# Patient Record
Sex: MALE | Race: WHITE | NOT HISPANIC OR LATINO | Employment: OTHER | URBAN - METROPOLITAN AREA
[De-identification: names, ages, dates, MRNs, and addresses within clinical notes are randomized per-mention and may not be internally consistent; named-entity substitution may affect disease eponyms.]

---

## 2017-02-01 ENCOUNTER — OFFICE VISIT (OUTPATIENT)
Dept: FAMILY MEDICINE CLINIC | Facility: CLINIC | Age: 82
End: 2017-02-01

## 2017-02-01 VITALS
SYSTOLIC BLOOD PRESSURE: 104 MMHG | TEMPERATURE: 97.3 F | DIASTOLIC BLOOD PRESSURE: 54 MMHG | HEIGHT: 75 IN | OXYGEN SATURATION: 96 % | HEART RATE: 66 BPM | BODY MASS INDEX: 15.02 KG/M2 | WEIGHT: 120.8 LBS | RESPIRATION RATE: 17 BRPM

## 2017-02-01 DIAGNOSIS — F03.90 DEMENTIA WITHOUT BEHAVIORAL DISTURBANCE, UNSPECIFIED DEMENTIA TYPE: ICD-10-CM

## 2017-02-01 DIAGNOSIS — M48.061 SPINAL STENOSIS OF LUMBAR REGION: ICD-10-CM

## 2017-02-01 DIAGNOSIS — R35.1 NOCTURIA: ICD-10-CM

## 2017-02-01 DIAGNOSIS — Z79.899 HIGH RISK MEDICATION USE: Primary | ICD-10-CM

## 2017-02-01 DIAGNOSIS — K92.1 FRANK BLOOD IN STOOL: ICD-10-CM

## 2017-02-01 DIAGNOSIS — I73.9 PVD (PERIPHERAL VASCULAR DISEASE) (HCC): ICD-10-CM

## 2017-02-01 PROCEDURE — 99213 OFFICE O/P EST LOW 20 MIN: CPT | Performed by: FAMILY MEDICINE

## 2017-02-01 RX ORDER — TRAMADOL HYDROCHLORIDE 50 MG/1
50 TABLET ORAL EVERY 6 HOURS PRN
Qty: 40 TABLET | Refills: 5 | Status: SHIPPED | OUTPATIENT
Start: 2017-02-01

## 2017-02-01 NOTE — PROGRESS NOTES
HPI  Jefferson Vasques is a 85 y.o. male who is here for follow up of ongoing medical issues.  Wife reports hip pain has worsened and is uncontrolled with Tylenol.  Trial of other medications in the past but caused too much mental changes.  Continues with urinary symptoms and urinates 5 times per night.  Has caregivers at home.  Concerned about weight loss and is getting supplements etc.      Review of Systems   Constitutional: Positive for unexpected weight change.   Gastrointestinal:        Hip pain   Neurological: Positive for weakness.   All other systems reviewed and are negative.        No past medical history on file.    No past surgical history on file.    No family history on file.    Social History     Social History   • Marital status:      Spouse name: N/A   • Number of children: N/A   • Years of education: N/A     Occupational History   • Not on file.     Social History Main Topics   • Smoking status: Never Smoker   • Smokeless tobacco: Never Used   • Alcohol use No   • Drug use: No   • Sexual activity: No     Other Topics Concern   • Not on file     Social History Narrative   • No narrative on file         Physical Exam   Constitutional: He appears well-developed. No distress.   Very thin with little subcutaneous tissue   HENT:   Head: Normocephalic.   Mouth/Throat: Oropharynx is clear and moist.   Eyes: Conjunctivae are normal. Pupils are equal, round, and reactive to light.   Neck:   Slightly wasted   Cardiovascular: Normal rate.    Pulmonary/Chest: Effort normal. No respiratory distress.   Abdominal: Soft. He exhibits no distension. There is no tenderness.   Musculoskeletal: He exhibits no edema or deformity.   Neurological: He is alert. Coordination abnormal.   Generalized weakness only able to transfer short distances from wheelchair   Skin: Skin is warm and dry.   Psychiatric: He has a normal mood and affect. His behavior is normal. Judgment and thought content normal.   Nursing note and  vitals reviewed.        Assessment/Plan    Jefferson was seen today for dementia, hyperlipidemia and hip pain.    Diagnoses and all orders for this visit:    High risk medication use  -     Basic Metabolic Panel    Maurizio blood in stool  -     CBC & Differential    PVD (peripheral vascular disease)    Dementia without behavioral disturbance, unspecified dementia type    Spinal stenosis of lumbar region    Nocturia    Other orders  -     traMADol (ULTRAM) 50 MG tablet; Take 1 tablet by mouth Every 6 (Six) Hours As Needed for moderate pain (4-6).      Routine follow-up of multiple medical problems.  Remains mostly confined to wheelchair and is having assistance at home 24 hours.  Main complaint is worsening hip pain especially awakening him early in the morning.  Again discussed treatment options specifically when necessary pain medication.  Potential risks versus benefits.  Will give trial of tramadol and call depending on response.  Otherwise continue current therapy including nutritional supplements as discussed.    This note includes information entered using a voice recognition dictation system.  Though reviewed, some nonsensible errors may remain.

## 2017-02-02 LAB
BASOPHILS # BLD AUTO: 0.02 10*3/MM3 (ref 0–0.2)
BASOPHILS NFR BLD AUTO: 0.3 % (ref 0–1.5)
BUN SERPL-MCNC: 19 MG/DL (ref 8–23)
BUN/CREAT SERPL: 27.5 (ref 7–25)
CALCIUM SERPL-MCNC: 9.7 MG/DL (ref 8.6–10.5)
CHLORIDE SERPL-SCNC: 97 MMOL/L (ref 98–107)
CO2 SERPL-SCNC: 29.7 MMOL/L (ref 22–29)
CREAT SERPL-MCNC: 0.69 MG/DL (ref 0.76–1.27)
EOSINOPHIL # BLD AUTO: 0.06 10*3/MM3 (ref 0–0.7)
EOSINOPHIL NFR BLD AUTO: 1 % (ref 0.3–6.2)
ERYTHROCYTE [DISTWIDTH] IN BLOOD BY AUTOMATED COUNT: 14.3 % (ref 11.5–14.5)
GLUCOSE SERPL-MCNC: 69 MG/DL (ref 65–99)
HCT VFR BLD AUTO: 39.5 % (ref 40.4–52.2)
HGB BLD-MCNC: 12.6 G/DL (ref 13.7–17.6)
IMM GRANULOCYTES # BLD: 0 10*3/MM3 (ref 0–0.03)
IMM GRANULOCYTES NFR BLD: 0 % (ref 0–0.5)
LYMPHOCYTES # BLD AUTO: 1.02 10*3/MM3 (ref 0.9–4.8)
LYMPHOCYTES NFR BLD AUTO: 16.8 % (ref 19.6–45.3)
MCH RBC QN AUTO: 30.3 PG (ref 27–32.7)
MCHC RBC AUTO-ENTMCNC: 31.9 G/DL (ref 32.6–36.4)
MCV RBC AUTO: 95 FL (ref 79.8–96.2)
MONOCYTES # BLD AUTO: 0.84 10*3/MM3 (ref 0.2–1.2)
MONOCYTES NFR BLD AUTO: 13.9 % (ref 5–12)
NEUTROPHILS # BLD AUTO: 4.12 10*3/MM3 (ref 1.9–8.1)
NEUTROPHILS NFR BLD AUTO: 68 % (ref 42.7–76)
PLATELET # BLD AUTO: 278 10*3/MM3 (ref 140–500)
POTASSIUM SERPL-SCNC: 4.8 MMOL/L (ref 3.5–5.2)
RBC # BLD AUTO: 4.16 10*6/MM3 (ref 4.6–6)
SODIUM SERPL-SCNC: 138 MMOL/L (ref 136–145)
WBC # BLD AUTO: 6.06 10*3/MM3 (ref 4.5–10.7)

## 2017-05-10 ENCOUNTER — OFFICE VISIT (OUTPATIENT)
Dept: FAMILY MEDICINE CLINIC | Facility: CLINIC | Age: 82
End: 2017-05-10

## 2017-05-10 VITALS
HEART RATE: 57 BPM | BODY MASS INDEX: 16.16 KG/M2 | DIASTOLIC BLOOD PRESSURE: 50 MMHG | RESPIRATION RATE: 17 BRPM | SYSTOLIC BLOOD PRESSURE: 94 MMHG | WEIGHT: 130 LBS | HEIGHT: 75 IN | OXYGEN SATURATION: 96 % | TEMPERATURE: 97.7 F

## 2017-05-10 DIAGNOSIS — M48.061 SPINAL STENOSIS OF LUMBAR REGION: ICD-10-CM

## 2017-05-10 DIAGNOSIS — Z79.899 HIGH RISK MEDICATION USE: ICD-10-CM

## 2017-05-10 DIAGNOSIS — R19.7 DIARRHEA OF PRESUMED INFECTIOUS ORIGIN: Primary | ICD-10-CM

## 2017-05-10 DIAGNOSIS — I73.9 PVD (PERIPHERAL VASCULAR DISEASE) (HCC): ICD-10-CM

## 2017-05-10 DIAGNOSIS — R27.0 ATAXIA: ICD-10-CM

## 2017-05-10 DIAGNOSIS — Z86.39 HISTORY OF DIABETES MELLITUS: ICD-10-CM

## 2017-05-10 DIAGNOSIS — F03.90 DEMENTIA WITHOUT BEHAVIORAL DISTURBANCE, UNSPECIFIED DEMENTIA TYPE: ICD-10-CM

## 2017-05-10 LAB
ALBUMIN SERPL-MCNC: 3.9 G/DL (ref 3.5–5.2)
ALBUMIN/GLOB SERPL: 1.5 G/DL
ALP SERPL-CCNC: 73 U/L (ref 39–117)
ALT SERPL-CCNC: 39 U/L (ref 1–41)
AST SERPL-CCNC: 29 U/L (ref 1–40)
BASOPHILS # BLD AUTO: 0.02 10*3/MM3 (ref 0–0.2)
BASOPHILS NFR BLD AUTO: 0.3 % (ref 0–1.5)
BILIRUB SERPL-MCNC: 0.2 MG/DL (ref 0.1–1.2)
BUN SERPL-MCNC: 17 MG/DL (ref 8–23)
BUN/CREAT SERPL: 26.2 (ref 7–25)
CALCIUM SERPL-MCNC: 9.1 MG/DL (ref 8.6–10.5)
CHLORIDE SERPL-SCNC: 94 MMOL/L (ref 98–107)
CO2 SERPL-SCNC: 27.5 MMOL/L (ref 22–29)
CREAT SERPL-MCNC: 0.65 MG/DL (ref 0.76–1.27)
DIFFERENTIAL COMMENT: NORMAL
EOSINOPHIL # BLD AUTO: 0.13 10*3/MM3 (ref 0–0.7)
EOSINOPHIL NFR BLD AUTO: 2.2 % (ref 0.3–6.2)
ERYTHROCYTE [DISTWIDTH] IN BLOOD BY AUTOMATED COUNT: 13.3 % (ref 11.5–14.5)
GLOBULIN SER CALC-MCNC: 2.6 GM/DL
GLUCOSE SERPL-MCNC: 64 MG/DL (ref 65–99)
HBA1C MFR BLD: 5.42 % (ref 4.8–5.6)
HCT VFR BLD AUTO: 36.7 % (ref 40.4–52.2)
HGB BLD-MCNC: 11.9 G/DL (ref 13.7–17.6)
IMM GRANULOCYTES # BLD: 0 10*3/MM3 (ref 0–0.03)
IMM GRANULOCYTES NFR BLD: 0 % (ref 0–0.5)
LYMPHOCYTES # BLD AUTO: 0.98 10*3/MM3 (ref 0.9–4.8)
LYMPHOCYTES NFR BLD AUTO: 16.7 % (ref 19.6–45.3)
MCH RBC QN AUTO: 30.6 PG (ref 27–32.7)
MCHC RBC AUTO-ENTMCNC: 32.4 G/DL (ref 32.6–36.4)
MCV RBC AUTO: 94.3 FL (ref 79.8–96.2)
MONOCYTES # BLD AUTO: 1.35 10*3/MM3 (ref 0.2–1.2)
MONOCYTES NFR BLD AUTO: 23 % (ref 5–12)
NEUTROPHILS # BLD AUTO: 3.38 10*3/MM3 (ref 1.9–8.1)
NEUTROPHILS NFR BLD AUTO: 57.8 % (ref 42.7–76)
NRBC BLD AUTO-RTO: 0 /100 WBC (ref 0–0)
PLATELET # BLD AUTO: 197 10*3/MM3 (ref 140–500)
PLATELET BLD QL SMEAR: NORMAL
POTASSIUM SERPL-SCNC: 4.3 MMOL/L (ref 3.5–5.2)
PROT SERPL-MCNC: 6.5 G/DL (ref 6–8.5)
RBC # BLD AUTO: 3.89 10*6/MM3 (ref 4.6–6)
RBC MORPH BLD: NORMAL
SODIUM SERPL-SCNC: 134 MMOL/L (ref 136–145)
WBC # BLD AUTO: 5.86 10*3/MM3 (ref 4.5–10.7)

## 2017-05-10 PROCEDURE — 99214 OFFICE O/P EST MOD 30 MIN: CPT | Performed by: FAMILY MEDICINE

## 2017-05-10 RX ORDER — MIRTAZAPINE 15 MG/1
15 TABLET, FILM COATED ORAL NIGHTLY
COMMUNITY
End: 2017-05-19

## 2017-05-19 ENCOUNTER — OFFICE VISIT (OUTPATIENT)
Dept: FAMILY MEDICINE CLINIC | Facility: CLINIC | Age: 82
End: 2017-05-19

## 2017-05-19 VITALS
HEIGHT: 75 IN | WEIGHT: 132 LBS | DIASTOLIC BLOOD PRESSURE: 60 MMHG | TEMPERATURE: 97.6 F | OXYGEN SATURATION: 94 % | HEART RATE: 91 BPM | RESPIRATION RATE: 16 BRPM | BODY MASS INDEX: 16.41 KG/M2 | SYSTOLIC BLOOD PRESSURE: 120 MMHG

## 2017-05-19 DIAGNOSIS — R63.0 LOSS OF APPETITE: ICD-10-CM

## 2017-05-19 DIAGNOSIS — F03.90 DEMENTIA WITHOUT BEHAVIORAL DISTURBANCE, UNSPECIFIED DEMENTIA TYPE: Primary | ICD-10-CM

## 2017-05-19 DIAGNOSIS — R64 CACHEXIA (HCC): ICD-10-CM

## 2017-05-19 DIAGNOSIS — M48.061 SPINAL STENOSIS OF LUMBAR REGION: ICD-10-CM

## 2017-05-19 PROCEDURE — 99213 OFFICE O/P EST LOW 20 MIN: CPT | Performed by: FAMILY MEDICINE

## 2017-05-22 RX ORDER — AMOXICILLIN AND CLAVULANATE POTASSIUM 875; 125 MG/1; MG/1
1 TABLET, FILM COATED ORAL 2 TIMES DAILY
Qty: 20 TABLET | Refills: 0 | Status: SHIPPED | OUTPATIENT
Start: 2017-05-22 | End: 2017-05-23 | Stop reason: SINTOL

## 2017-05-23 RX ORDER — AMOXICILLIN 500 MG/1
1000 CAPSULE ORAL 2 TIMES DAILY
Qty: 40 CAPSULE | Refills: 0 | Status: SHIPPED | OUTPATIENT
Start: 2017-05-23

## 2017-06-05 ENCOUNTER — TELEPHONE (OUTPATIENT)
Dept: FAMILY MEDICINE CLINIC | Facility: CLINIC | Age: 82
End: 2017-06-05

## 2017-06-05 DIAGNOSIS — R45.1 AGITATION: Primary | ICD-10-CM

## 2017-06-05 RX ORDER — ALPRAZOLAM 0.25 MG/1
0.25 TABLET ORAL 3 TIMES DAILY PRN
Qty: 40 TABLET | Refills: 3 | Status: SHIPPED | OUTPATIENT
Start: 2017-06-05

## 2017-06-05 NOTE — TELEPHONE ENCOUNTER
Please call Yodit at [facility] and relay message from Dr. Rahman.    Thank you.    ----- Message from Maurizio Rahman MD sent at 6/5/2017 12:40 PM EDT -----  Ok to give verbal order for evaluation/tx    ----- Message -----     From: Sima Card     Sent: 6/5/2017   9:43 AM       To: MD YODIT Perla FROM [facility] CALLED.  SHE SAID THEY NEED ORDERS FOR EVALUATING PT.    PHONE [phone]  [phone]

## 2017-06-05 NOTE — TELEPHONE ENCOUNTER
Called in rx: xanax 0.25 mg, qty: 40 into [facility] pharmacy with 3 refills.    Thank you.    ----- Message from Maurizio Rahman MD sent at 6/5/2017 12:49 PM EDT -----  Talked with son.  Call prescription to local pharmacy with 3 refills..    ----- Message -----     From: Alexandra Bajwa     Sent: 6/5/2017   8:44 AM       To: Maurizio Rahman MD    Kp, the patients son would like for you to give him a call. He is concerned that his fathers dementia has taken a turn for the worst. He is very anxious and confused, having trouble sitting still. He wasn't sure if he needed to bring him in or you could call something in for him. He can be reached at [phone].    Thanks,  Brook